# Patient Record
Sex: FEMALE | URBAN - METROPOLITAN AREA
[De-identification: names, ages, dates, MRNs, and addresses within clinical notes are randomized per-mention and may not be internally consistent; named-entity substitution may affect disease eponyms.]

---

## 2017-03-17 ENCOUNTER — APPOINTMENT (RX ONLY)
Dept: URBAN - METROPOLITAN AREA CLINIC 24 | Facility: CLINIC | Age: 47
Setting detail: DERMATOLOGY
End: 2017-03-17

## 2017-03-17 DIAGNOSIS — L64.8 OTHER ANDROGENIC ALOPECIA: ICD-10-CM

## 2017-03-17 DIAGNOSIS — L82.1 OTHER SEBORRHEIC KERATOSIS: ICD-10-CM

## 2017-03-17 DIAGNOSIS — L72.8 OTHER FOLLICULAR CYSTS OF THE SKIN AND SUBCUTANEOUS TISSUE: ICD-10-CM

## 2017-03-17 DIAGNOSIS — L73.8 OTHER SPECIFIED FOLLICULAR DISORDERS: ICD-10-CM

## 2017-03-17 DIAGNOSIS — L72.0 EPIDERMAL CYST: ICD-10-CM

## 2017-03-17 DIAGNOSIS — Z87.891 PERSONAL HISTORY OF NICOTINE DEPENDENCE: ICD-10-CM

## 2017-03-17 DIAGNOSIS — L21.8 OTHER SEBORRHEIC DERMATITIS: ICD-10-CM

## 2017-03-17 DIAGNOSIS — L71.8 OTHER ROSACEA: ICD-10-CM

## 2017-03-17 PROBLEM — F32.9 MAJOR DEPRESSIVE DISORDER, SINGLE EPISODE, UNSPECIFIED: Status: ACTIVE | Noted: 2017-03-17

## 2017-03-17 PROBLEM — F41.9 ANXIETY DISORDER, UNSPECIFIED: Status: ACTIVE | Noted: 2017-03-17

## 2017-03-17 PROBLEM — L85.3 XEROSIS CUTIS: Status: ACTIVE | Noted: 2017-03-17

## 2017-03-17 PROBLEM — L55.1 SUNBURN OF SECOND DEGREE: Status: ACTIVE | Noted: 2017-03-17

## 2017-03-17 PROBLEM — Z92.3 PERSONAL HISTORY OF IRRADIATION: Status: ACTIVE | Noted: 2017-03-17

## 2017-03-17 PROBLEM — L70.0 ACNE VULGARIS: Status: ACTIVE | Noted: 2017-03-17

## 2017-03-17 PROBLEM — L29.8 OTHER PRURITUS: Status: ACTIVE | Noted: 2017-03-17

## 2017-03-17 PROCEDURE — ? COUNSELING

## 2017-03-17 PROCEDURE — ? DEFER

## 2017-03-17 PROCEDURE — 99203 OFFICE O/P NEW LOW 30 MIN: CPT

## 2017-03-17 PROCEDURE — ? PRESCRIPTION

## 2017-03-17 PROCEDURE — ? TREATMENT REGIMEN

## 2017-03-17 RX ORDER — METRONIDAZOLE 7.5 MG/G
THIN FILM GEL TOPICAL QD
Qty: 1 | Refills: 6 | Status: ERX | COMMUNITY
Start: 2017-03-17

## 2017-03-17 RX ORDER — KETOCONAZOLE 20.5 MG/ML
AS DIRECTED SHAMPOO, SUSPENSION TOPICAL PRN
Qty: 1 | Refills: 11 | Status: ERX | COMMUNITY
Start: 2017-03-17

## 2017-03-17 RX ORDER — CLOBETASOL PROPIONATE 0.46 MG/ML
AS DIRECTED SOLUTION TOPICAL QD
Qty: 1 | Refills: 6 | Status: ERX | COMMUNITY
Start: 2017-03-17

## 2017-03-17 RX ADMIN — KETOCONAZOLE AS DIRECTED: 20.5 SHAMPOO, SUSPENSION TOPICAL at 00:00

## 2017-03-17 RX ADMIN — METRONIDAZOLE THIN FILM: 7.5 GEL TOPICAL at 00:00

## 2017-03-17 RX ADMIN — CLOBETASOL PROPIONATE AS DIRECTED: 0.46 SOLUTION TOPICAL at 00:00

## 2017-03-17 ASSESSMENT — LOCATION DETAILED DESCRIPTION DERM
LOCATION DETAILED: LEFT CENTRAL TEMPLE
LOCATION DETAILED: MID-FRONTAL SCALP
LOCATION DETAILED: RIGHT CENTRAL MALAR CHEEK
LOCATION DETAILED: LEFT AXILLARY TAIL OF BREAST
LOCATION DETAILED: PERIUMBILICAL SKIN
LOCATION DETAILED: LEFT CENTRAL MALAR CHEEK
LOCATION DETAILED: RIGHT MEDIAL FOREHEAD
LOCATION DETAILED: RIGHT CENTRAL TEMPLE
LOCATION DETAILED: RIGHT SUPERIOR LATERAL MALAR CHEEK
LOCATION DETAILED: SUPERIOR MID FOREHEAD
LOCATION DETAILED: LEFT MEDIAL FRONTAL SCALP

## 2017-03-17 ASSESSMENT — LOCATION SIMPLE DESCRIPTION DERM
LOCATION SIMPLE: RIGHT CHEEK
LOCATION SIMPLE: ANTERIOR SCALP
LOCATION SIMPLE: RIGHT FOREHEAD
LOCATION SIMPLE: RIGHT TEMPLE
LOCATION SIMPLE: ABDOMEN
LOCATION SIMPLE: LEFT TEMPLE
LOCATION SIMPLE: LEFT SCALP
LOCATION SIMPLE: SUPERIOR FOREHEAD
LOCATION SIMPLE: LEFT BREAST
LOCATION SIMPLE: LEFT CHEEK

## 2017-03-17 ASSESSMENT — LOCATION ZONE DERM
LOCATION ZONE: TRUNK
LOCATION ZONE: SCALP
LOCATION ZONE: FACE

## 2017-03-17 NOTE — PROCEDURE: TREATMENT REGIMEN
Otc Regimen: -Rogaine 5% foam use as directed
Detail Level: Zone
Plan: -Recommend patient to schedule consult with  for BBL treatment
Initiate Treatment: -Clobetasol sol 1-2 times daily \\n-Ketoconazole shampoo 2-3 times daily , alternate with otc dandruff shampoo

## 2017-03-17 NOTE — PROCEDURE: MIPS QUALITY
Quality 431: Preventive Care And Screening: Unhealthy Alcohol Use - Screening: Patient screened for unhealthy alcohol use using a single question and scores 2 or greater episodes per year and brief intervention occurred
Detail Level: Detailed

## 2017-03-17 NOTE — PROCEDURE: COUNSELING
Detail Level: Zone
Detail Level: Simple
Detail Level: Detailed
Quality 226: Preventive Care And Screening: Tobacco Use: Screening And Cessation Intervention: Patient screened for tobacco and is an ex-smoker
Detail Level: Generalized

## 2020-07-08 ENCOUNTER — APPOINTMENT (RX ONLY)
Dept: URBAN - METROPOLITAN AREA CLINIC 24 | Facility: CLINIC | Age: 50
Setting detail: DERMATOLOGY
End: 2020-07-08

## 2020-07-08 DIAGNOSIS — L40.0 PSORIASIS VULGARIS: ICD-10-CM | Status: INADEQUATELY CONTROLLED

## 2020-07-08 DIAGNOSIS — L40.8 OTHER PSORIASIS: ICD-10-CM | Status: INADEQUATELY CONTROLLED

## 2020-07-08 DIAGNOSIS — L71.8 OTHER ROSACEA: ICD-10-CM

## 2020-07-08 PROCEDURE — ? COUNSELING

## 2020-07-08 PROCEDURE — ? PRESCRIPTION

## 2020-07-08 PROCEDURE — 99203 OFFICE O/P NEW LOW 30 MIN: CPT

## 2020-07-08 PROCEDURE — ? TREATMENT REGIMEN

## 2020-07-08 RX ORDER — CALCIPOTRIENE 0.05 MG/G
1 CREAM TOPICAL BID
Qty: 3 | Refills: 4 | Status: ERX | COMMUNITY
Start: 2020-07-08

## 2020-07-08 RX ORDER — KETOCONAZOLE 20 MG/ML
1 SHAMPOO, SUSPENSION TOPICAL QD
Qty: 3 | Refills: 4 | Status: ERX | COMMUNITY
Start: 2020-07-08

## 2020-07-08 RX ORDER — CLOBETASOL PROPIONATE 0.5 MG/G
1 CREAM TOPICAL BID
Qty: 1 | Refills: 11 | Status: ERX | COMMUNITY
Start: 2020-07-08

## 2020-07-08 RX ORDER — METRONIDAZOLE 7.5 MG/G
1 GEL TOPICAL QHS
Qty: 3 | Refills: 4 | Status: ERX | COMMUNITY
Start: 2020-07-08

## 2020-07-08 RX ORDER — HALOBETASOL PROPIONATE AND TAZAROTENE .1; .45 MG/G; MG/G
1 LOTION TOPICAL QD
Qty: 1 | Refills: 11 | Status: ERX | COMMUNITY
Start: 2020-07-08

## 2020-07-08 RX ORDER — CLOBETASOL PROPIONATE 0.5 MG/ML
1 SOLUTION TOPICAL BID
Qty: 3 | Refills: 4 | Status: ERX | COMMUNITY
Start: 2020-07-08

## 2020-07-08 RX ADMIN — KETOCONAZOLE 1: 20 SHAMPOO, SUSPENSION TOPICAL at 00:00

## 2020-07-08 RX ADMIN — CLOBETASOL PROPIONATE 1: 0.5 CREAM TOPICAL at 00:00

## 2020-07-08 RX ADMIN — HALOBETASOL PROPIONATE AND TAZAROTENE 1: .1; .45 LOTION TOPICAL at 00:00

## 2020-07-08 RX ADMIN — CALCIPOTRIENE 1: 0.05 CREAM TOPICAL at 00:00

## 2020-07-08 RX ADMIN — METRONIDAZOLE 1: 7.5 GEL TOPICAL at 00:00

## 2020-07-08 RX ADMIN — CLOBETASOL PROPIONATE 1: 0.5 SOLUTION TOPICAL at 00:00

## 2020-07-08 ASSESSMENT — LOCATION DETAILED DESCRIPTION DERM
LOCATION DETAILED: RIGHT CENTRAL FRONTAL SCALP
LOCATION DETAILED: LEFT RIB CAGE
LOCATION DETAILED: PERIUMBILICAL SKIN
LOCATION DETAILED: RIGHT RIB CAGE
LOCATION DETAILED: MID-OCCIPITAL SCALP
LOCATION DETAILED: LEFT CHIN
LOCATION DETAILED: LEFT SUPERIOR POSTAURICULAR SKIN
LOCATION DETAILED: RIGHT INFERIOR PARIETAL SCALP
LOCATION DETAILED: LEFT CENTRAL FRONTAL SCALP
LOCATION DETAILED: SACRAL SPINE

## 2020-07-08 ASSESSMENT — LOCATION ZONE DERM
LOCATION ZONE: SCALP
LOCATION ZONE: FACE
LOCATION ZONE: TRUNK

## 2020-07-08 ASSESSMENT — PGA PSORIASIS: PGA PSORIASIS 2020: MODERATE

## 2020-07-08 ASSESSMENT — LOCATION SIMPLE DESCRIPTION DERM
LOCATION SIMPLE: CHIN
LOCATION SIMPLE: LOWER BACK
LOCATION SIMPLE: POSTERIOR SCALP
LOCATION SIMPLE: ABDOMEN
LOCATION SIMPLE: SCALP

## 2020-07-08 NOTE — PROCEDURE: TREATMENT REGIMEN
Action 2: Continue
Plan: Patient reports scaly rash present x 6 months\\nInvolvement on scalp, buttocks and belly button\\nPatient denies history/family history of psoriasis\\nPatient has ankle pains, not formally diagnosed with PsA\\nRecommend topical therapy at this time\\nIf not improved, or condition worsens/spreads : to consider systemic therapy at that time\\nTrial of duobrii lotion. (Will send clobetasol if duobrii is not covered)\\n\\nRTC in 8 weeks
Detail Level: Simple
Initiate Regimen: Duobrii lotion: QHS to affected areas of body x 2 weeks/month\\nCalcipotriene cream: 1-2x daily (may alternate with steroid)\\nClobetasol scalp solution: 1-2x daily PRN\\nKetoconazole shampoo: PRN, may alternate with OTC dandruff shampoos
Initiate Regimen: Calcipotriene cream: 1-2x daily\\nDuobrii lotion: QHS x 2 weeks/month
Plan: Present x 6 months\\nNo previous hx or family history of psoriasis\\nWill start with topical therapy at this time\\nTrial of duobrii lotion and calcipotriene cream\\nDiscussed risk of steroid atrophy\\nAdvised patient that she may alternate duobrii and calcipotriene to limit use of topical steroid to no more than 2 weeks/month\\nPatient may continue Tea Tree body wash\\n\\nRTC in 2-3 months
Detail Level: Generalized
Plan: Requesting refills at today’s visit \\nWill send 3 month supply to pharmacy given COVID pandemic
Continue Regimen: Metronidazole 0.75% gel: QHS

## 2020-07-08 NOTE — PROCEDURE: MIPS QUALITY
Quality 110: Preventive Care And Screening: Influenza Immunization: Influenza Immunization not Administered because Patient Refused.
Quality 111:Pneumonia Vaccination Status For Older Adults: Pneumococcal Vaccination not Administered or Previously Received, Reason not Otherwise Specified
Quality 226: Preventive Care And Screening: Tobacco Use: Screening And Cessation Intervention: Patient screened for tobacco use and is an ex/non-smoker
Detail Level: Detailed
Quality 431: Preventive Care And Screening: Unhealthy Alcohol Use - Screening: Patient screened for unhealthy alcohol use using a single question and scores less than 2 times per year

## 2020-07-08 NOTE — PROCEDURE: COUNSELING
Quality 410: Psoriasis Clinical Response To Oral Systemic Or Biologic Medications: Psoriasis Assessment Tool Documented, Not Meeting Specified Benchmark
Detail Level: Zone
Quality 337: Tuberculosis Prevention For Psoriasis And Psoriatic Arthritis Patients On A Biological Immune Response Modifier: No documentation of negative or managed positive TB screen

## 2020-08-19 ENCOUNTER — APPOINTMENT (RX ONLY)
Dept: URBAN - METROPOLITAN AREA CLINIC 24 | Facility: CLINIC | Age: 50
Setting detail: DERMATOLOGY
End: 2020-08-19

## 2020-08-19 DIAGNOSIS — L71.8 OTHER ROSACEA: ICD-10-CM

## 2020-08-19 DIAGNOSIS — L40.0 PSORIASIS VULGARIS: ICD-10-CM | Status: IMPROVED

## 2020-08-19 DIAGNOSIS — L40.8 OTHER PSORIASIS: ICD-10-CM | Status: STABLE

## 2020-08-19 PROCEDURE — ? COUNSELING

## 2020-08-19 PROCEDURE — ? TREATMENT REGIMEN

## 2020-08-19 PROCEDURE — ? PRESCRIPTION

## 2020-08-19 PROCEDURE — 99213 OFFICE O/P EST LOW 20 MIN: CPT

## 2020-08-19 RX ORDER — DESONIDE 0.5 MG/G
1 CREAM TOPICAL BID
Qty: 1 | Refills: 6 | Status: ERX | COMMUNITY
Start: 2020-08-19

## 2020-08-19 RX ADMIN — DESONIDE 1: 0.5 CREAM TOPICAL at 00:00

## 2020-08-19 ASSESSMENT — LOCATION SIMPLE DESCRIPTION DERM
LOCATION SIMPLE: CHIN
LOCATION SIMPLE: SCALP
LOCATION SIMPLE: ABDOMEN
LOCATION SIMPLE: LOWER BACK
LOCATION SIMPLE: POSTERIOR SCALP

## 2020-08-19 ASSESSMENT — LOCATION DETAILED DESCRIPTION DERM
LOCATION DETAILED: LEFT RIB CAGE
LOCATION DETAILED: LEFT CENTRAL FRONTAL SCALP
LOCATION DETAILED: RIGHT CENTRAL FRONTAL SCALP
LOCATION DETAILED: PERIUMBILICAL SKIN
LOCATION DETAILED: LEFT CHIN
LOCATION DETAILED: RIGHT RIB CAGE
LOCATION DETAILED: SACRAL SPINE
LOCATION DETAILED: LEFT SUPERIOR POSTAURICULAR SKIN
LOCATION DETAILED: MID-OCCIPITAL SCALP
LOCATION DETAILED: RIGHT INFERIOR PARIETAL SCALP

## 2020-08-19 ASSESSMENT — LOCATION ZONE DERM
LOCATION ZONE: TRUNK
LOCATION ZONE: SCALP
LOCATION ZONE: FACE

## 2020-08-19 NOTE — PROCEDURE: TREATMENT REGIMEN
Action 2: Continue
Plan: PRESENT:\\n\\nPatient reports improvement since last visit\\nInflammation still present, but improved\\nPatient was unable to fill calcipotriene, reports it was too expensive\\nPatient has been using clobetasol solution QAM on scalp, and duobrii lotion QHS to all affected areas\\nPatient states she has used the duobrii 4-5x week to delicate areas\\nAdvised patient that prolonged use on delicate skin can cause steroid atrophy\\nDiscussed prescribing a milder topical steroid for maintenance\\nPatient instructed to use desonide cream on inverse psoriasis \\n\\nHISTORY:\\n\\nPatient reports scaly rash present x 7-8 months\\nInvolvement on scalp, buttocks and belly button\\nPatient denies history/family history of psoriasis\\nPatient has ankle pains, not formally diagnosed with PsA\\nRecommend topical therapy at this time\\nIf not improved, or condition worsens/spreads : to consider systemic therapy at that time
Detail Level: Simple
Continue Regimen: SCALP:\\n- clobetasol solution: QAM\\n- duobrii lotion: QHS\\n- Ketoconazole shampoo: PRN
Initiate Regimen: TRUNK:\\n- Desonide cream: BID PRN up to 2 weeks/month
Initiate Regimen: Desonide cream: BID PRN up to 2 weeks/month
Plan: Patient states she has used the duobrii 4-5x week to delicate areas\\nAdvised patient that prolonged use on delicate skin can cause steroid atrophy\\nDiscussed prescribing a milder topical steroid for maintenance\\nPatient instructed to use desonide cream on inverse psoriasis \\n\\nRTC in 6 months
Detail Level: Generalized
Continue Regimen: Metronidazole 0.75% gel: QHS

## 2021-06-09 ENCOUNTER — RX ONLY (OUTPATIENT)
Age: 51
Setting detail: RX ONLY
End: 2021-06-09

## 2021-06-09 ENCOUNTER — APPOINTMENT (RX ONLY)
Dept: URBAN - METROPOLITAN AREA CLINIC 24 | Facility: CLINIC | Age: 51
Setting detail: DERMATOLOGY
End: 2021-06-09

## 2021-06-09 DIAGNOSIS — L40.0 PSORIASIS VULGARIS: ICD-10-CM

## 2021-06-09 DIAGNOSIS — L29.8 OTHER PRURITUS: ICD-10-CM

## 2021-06-09 DIAGNOSIS — L29.89 OTHER PRURITUS: ICD-10-CM

## 2021-06-09 DIAGNOSIS — L40.8 OTHER PSORIASIS: ICD-10-CM | Status: INADEQUATELY CONTROLLED

## 2021-06-09 PROCEDURE — 99214 OFFICE O/P EST MOD 30 MIN: CPT

## 2021-06-09 PROCEDURE — ? TREATMENT REGIMEN

## 2021-06-09 PROCEDURE — ? COUNSELING

## 2021-06-09 PROCEDURE — ? PRESCRIPTION

## 2021-06-09 RX ORDER — MOMETASONE FUROATE 1 MG/G
1 CREAM TOPICAL QD
Qty: 1 | Refills: 6 | Status: ERX | COMMUNITY
Start: 2021-06-09

## 2021-06-09 RX ORDER — CLOBETASOL PROPIONATE 0.5 MG/ML
SOLUTION TOPICAL QD
Qty: 1 | Refills: 11 | Status: ERX

## 2021-06-09 RX ORDER — DESONIDE 0.5 MG/G
1 CREAM TOPICAL BID
Qty: 1 | Refills: 6 | Status: ERX

## 2021-06-09 RX ADMIN — MOMETASONE FUROATE 1: 1 CREAM TOPICAL at 00:00

## 2021-06-09 ASSESSMENT — LOCATION SIMPLE DESCRIPTION DERM
LOCATION SIMPLE: ABDOMEN
LOCATION SIMPLE: RIGHT BREAST
LOCATION SIMPLE: LOWER BACK
LOCATION SIMPLE: LABIA MAJORA
LOCATION SIMPLE: LEFT BREAST
LOCATION SIMPLE: GENITALIA
LOCATION SIMPLE: MONS PUBIS
LOCATION SIMPLE: SCALP
LOCATION SIMPLE: POSTERIOR SCALP
LOCATION SIMPLE: GROIN

## 2021-06-09 ASSESSMENT — LOCATION ZONE DERM
LOCATION ZONE: SCALP
LOCATION ZONE: TRUNK
LOCATION ZONE: VULVA

## 2021-06-09 ASSESSMENT — LOCATION DETAILED DESCRIPTION DERM
LOCATION DETAILED: RIGHT CENTRAL FRONTAL SCALP
LOCATION DETAILED: RIGHT INFERIOR PARIETAL SCALP
LOCATION DETAILED: LEFT INFRAMAMMARY CREASE (INNER QUADRANT)
LOCATION DETAILED: RIGHT INGUINAL CREASE
LOCATION DETAILED: RIGHT MONS PUBIS
LOCATION DETAILED: RIGHT RIB CAGE
LOCATION DETAILED: LEFT INGUINAL CREASE
LOCATION DETAILED: RIGHT INFRAMAMMARY CREASE (INNER QUADRANT)
LOCATION DETAILED: LEFT LABIUM MAJUS
LOCATION DETAILED: LEFT SUPERIOR POSTAURICULAR SKIN
LOCATION DETAILED: SACRAL SPINE
LOCATION DETAILED: GENITALIA
LOCATION DETAILED: LEFT CENTRAL FRONTAL SCALP
LOCATION DETAILED: MID-OCCIPITAL SCALP
LOCATION DETAILED: RIGHT LABIUM MAJUS
LOCATION DETAILED: LEFT MONS PUBIS
LOCATION DETAILED: LEFT RIB CAGE
LOCATION DETAILED: PERIUMBILICAL SKIN

## 2021-06-09 NOTE — PROCEDURE: COUNSELING
Detail Level: Generalized
Detail Level: Zone
Quality 337: Tuberculosis Prevention For Psoriasis And Psoriatic Arthritis Patients On A Biological Immune Response Modifier: No documentation of negative or managed positive TB screen
Detail Level: Simple

## 2021-06-09 NOTE — PROCEDURE: TREATMENT REGIMEN
Action 2: Continue
Plan: PRESENT:\\ngenerally well controlled on problem areas since last visit\\n-pt complains ears are flaring at this time, will treat with mometasone cream bid applied to external ear canal very carefully with q-tip up to 14 days/month \\n\\nPREVIOUS VISIT:\\nPatient reports improvement since last visit\\nInflammation still present, but improved\\nPatient was unable to fill calcipotriene, reports it was too expensive\\nPatient has been using clobetasol solution QAM on scalp, and duobrii lotion QHS to all affected areas\\nPatient states she has used the duobrii 4-5x week to delicate areas\\nAdvised patient that prolonged use on delicate skin can cause steroid atrophy\\nDiscussed prescribing a milder topical steroid for maintenance\\nPatient instructed to use desonide cream on inverse psoriasis \\n\\nHISTORY:\\nPatient reports scaly rash present x 7-8 months\\nInvolvement on scalp, buttocks and belly button\\nPatient denies history/family history of psoriasis\\nPatient has ankle pains, not formally diagnosed with PsA\\nRecommend topical therapy at this time\\nIf not improved, or condition worsens/spreads : to consider systemic therapy at that time
Detail Level: Simple
Continue Regimen: SCALP:\\n- clobetasol solution: QAM\\n- duobrii lotion: QHS\\n- Ketoconazole shampoo: PRN
Initiate Regimen: mometasone cream: BID PRN up to 2 weeks/month
Plan: -plaques under breasts are clear\\n-new involvement/flare on vulva, inguinal creases and gluteal crease\\n-will start mometasone cream bid up to 14 days/month max\\n-advised patient to avoid scratching/rubbing, as this will worsen condition\\n-counseled patient on risks of steroid atrophy\\n